# Patient Record
Sex: FEMALE | Race: WHITE | URBAN - METROPOLITAN AREA
[De-identification: names, ages, dates, MRNs, and addresses within clinical notes are randomized per-mention and may not be internally consistent; named-entity substitution may affect disease eponyms.]

---

## 2022-10-07 ENCOUNTER — OFFICE VISIT (OUTPATIENT)
Dept: OBGYN CLINIC | Facility: CLINIC | Age: 27
End: 2022-10-07
Payer: COMMERCIAL

## 2022-10-07 VITALS — SYSTOLIC BLOOD PRESSURE: 124 MMHG | WEIGHT: 205 LBS | DIASTOLIC BLOOD PRESSURE: 85 MMHG

## 2022-10-07 DIAGNOSIS — Z01.419 ENCOUNTER FOR WELL WOMAN EXAM WITH ROUTINE GYNECOLOGICAL EXAM: Primary | ICD-10-CM

## 2022-10-07 DIAGNOSIS — Z30.431 IUD CHECK UP: ICD-10-CM

## 2022-10-07 DIAGNOSIS — R87.612 LOW GRADE SQUAMOUS INTRAEPITHELIAL LESION (LGSIL) ON CERVICOVAGINAL CYTOLOGIC SMEAR: ICD-10-CM

## 2022-10-07 DIAGNOSIS — Z12.4 PAPANICOLAOU SMEAR FOR CERVICAL CANCER SCREENING: ICD-10-CM

## 2022-10-07 DIAGNOSIS — Z11.3 SCREENING EXAMINATION FOR SEXUALLY TRANSMITTED DISEASE: ICD-10-CM

## 2022-10-07 PROBLEM — F90.9 ATTENTION DEFICIT HYPERACTIVITY DISORDER (ADHD): Status: ACTIVE | Noted: 2022-10-07

## 2022-10-07 PROCEDURE — 3079F DIAST BP 80-89 MM HG: CPT | Performed by: NURSE PRACTITIONER

## 2022-10-07 PROCEDURE — 99385 PREV VISIT NEW AGE 18-39: CPT | Performed by: NURSE PRACTITIONER

## 2022-10-07 PROCEDURE — 3074F SYST BP LT 130 MM HG: CPT | Performed by: NURSE PRACTITIONER

## 2022-10-09 LAB
GENITAL VAGINOSIS SCREEN: POSITIVE
TRICHOMONAS SCREEN: NEGATIVE

## 2022-10-10 DIAGNOSIS — B37.31 VAGINAL CANDIDIASIS: ICD-10-CM

## 2022-10-10 DIAGNOSIS — N76.0 BACTERIAL VAGINOSIS: Primary | ICD-10-CM

## 2022-10-10 DIAGNOSIS — B96.89 BACTERIAL VAGINOSIS: Primary | ICD-10-CM

## 2022-10-10 LAB
C TRACH DNA SPEC QL NAA+PROBE: NEGATIVE
N GONORRHOEA DNA SPEC QL NAA+PROBE: NEGATIVE

## 2022-10-10 RX ORDER — FLUCONAZOLE 150 MG/1
150 TABLET ORAL ONCE
Qty: 2 TABLET | Refills: 0 | Status: SHIPPED | OUTPATIENT
Start: 2022-10-10 | End: 2022-10-10

## 2022-10-10 RX ORDER — METRONIDAZOLE 7.5 MG/G
1 GEL VAGINAL NIGHTLY
Qty: 1 EACH | Refills: 0 | Status: SHIPPED | OUTPATIENT
Start: 2022-10-10 | End: 2022-10-15

## 2022-10-31 ENCOUNTER — TELEPHONE (OUTPATIENT)
Dept: OBGYN CLINIC | Facility: CLINIC | Age: 27
End: 2022-10-31

## 2022-10-31 DIAGNOSIS — R87.610 ATYPICAL SQUAMOUS CELLS OF UNDETERMINED SIGNIFICANCE (ASCUS) ON PAPANICOLAOU SMEAR OF CERVIX: Primary | ICD-10-CM

## 2022-10-31 NOTE — TELEPHONE ENCOUNTER
Reviewed pap smear results and follow up HPV testing, will reach out with HPV results and follow up. Pt verbalized understanding and questions answered.

## 2022-11-10 ENCOUNTER — TELEPHONE (OUTPATIENT)
Dept: OBGYN CLINIC | Facility: CLINIC | Age: 27
End: 2022-11-10

## 2022-11-10 NOTE — TELEPHONE ENCOUNTER
Pt needed to know if she needed to schedule an appointment for her labs. Informed pt she does not need to schedule an appointment and that she can go to any [place] lab to have her labs done. Pt voices understanding.

## 2022-11-10 NOTE — TELEPHONE ENCOUNTER
Patient called back and scheduled HPV testing for 11/16, also wants to know if she can have bloodwork done at the same time. Please call to advise.

## 2022-11-10 NOTE — TELEPHONE ENCOUNTER
Attempted to reach patient, no answer left detailed message to call back office. Per Isabel Foss patient needs to return for HPV testing only. Patient had an ASCUS results on 10/7/2022 but no hpv was ran. Please help schedule patient for an appointment.

## 2022-11-16 ENCOUNTER — OFFICE VISIT (OUTPATIENT)
Dept: OBGYN CLINIC | Facility: CLINIC | Age: 27
End: 2022-11-16
Payer: COMMERCIAL

## 2022-11-16 VITALS — DIASTOLIC BLOOD PRESSURE: 92 MMHG | SYSTOLIC BLOOD PRESSURE: 132 MMHG | WEIGHT: 210 LBS

## 2022-11-16 DIAGNOSIS — Z11.51 SCREENING FOR HPV (HUMAN PAPILLOMAVIRUS): ICD-10-CM

## 2022-11-16 DIAGNOSIS — R35.0 URINARY FREQUENCY: ICD-10-CM

## 2022-11-16 DIAGNOSIS — R30.0 DYSURIA: Primary | ICD-10-CM

## 2022-11-16 LAB
BILIRUBIN: NEGATIVE
GLUCOSE (URINE DIPSTICK): NEGATIVE MG/DL
KETONES (URINE DIPSTICK): NEGATIVE MG/DL
MULTISTIX LOT#: ABNORMAL NUMERIC
NITRITE, URINE: POSITIVE
OCCULT BLOOD: NEGATIVE
PH, URINE: 6 (ref 4.5–8)
PROTEIN (URINE DIPSTICK): NEGATIVE MG/DL
SPECIFIC GRAVITY: 1.03 (ref 1–1.03)
UROBILINOGEN,SEMI-QN: 0.2 MG/DL (ref 0–1.9)

## 2022-11-16 PROCEDURE — 81002 URINALYSIS NONAUTO W/O SCOPE: CPT | Performed by: NURSE PRACTITIONER

## 2022-11-16 PROCEDURE — 99213 OFFICE O/P EST LOW 20 MIN: CPT | Performed by: NURSE PRACTITIONER

## 2022-11-16 PROCEDURE — 3075F SYST BP GE 130 - 139MM HG: CPT | Performed by: NURSE PRACTITIONER

## 2022-11-16 PROCEDURE — 3080F DIAST BP >= 90 MM HG: CPT | Performed by: NURSE PRACTITIONER

## 2022-11-16 RX ORDER — NITROFURANTOIN 25; 75 MG/1; MG/1
100 CAPSULE ORAL 2 TIMES DAILY
Qty: 14 CAPSULE | Refills: 0 | Status: SHIPPED | OUTPATIENT
Start: 2022-11-16 | End: 2022-11-21

## 2022-11-16 RX ORDER — METHYLPHENIDATE HYDROCHLORIDE 54 MG/1
TABLET ORAL
COMMUNITY
Start: 2012-01-30

## 2022-11-17 LAB
BILIRUB UR QL: NEGATIVE
COLOR UR: YELLOW
GLUCOSE UR-MCNC: NEGATIVE MG/DL
KETONES UR-MCNC: NEGATIVE MG/DL
NITRITE UR QL STRIP.AUTO: POSITIVE
PH UR: 5 [PH] (ref 5–8)
PROT UR-MCNC: 30 MG/DL
SP GR UR STRIP: 1.02 (ref 1–1.03)
UROBILINOGEN UR STRIP-ACNC: 2
VIT C UR-MCNC: NEGATIVE MG/DL
WBC #/AREA URNS AUTO: >50 /HPF
WBC CLUMPS UR QL AUTO: PRESENT /HPF

## 2022-11-18 LAB — HPV I/H RISK 1 DNA SPEC QL NAA+PROBE: NEGATIVE

## 2024-12-30 NOTE — PROGRESS NOTES
HPI:    Patient ID: Cinthia Saeed is a 29 year old female.  Patient  is in [employer] [place].  See GYNE  OCTOBER KYLEENA.    HPI New Patient/Physical Exam  29 year old female I am meeting for the first time.  She was living in [place] and recently moved back to [place].  She is on Vyvanse  She is on Topiramate for weight loss.    She went to a weight loss clinic in [place] and lost 38 pounds.  She was told she had fatty liver disease.    Does not vape  No smoking  No alcohol  + cannibals   Immunization History   Administered Date(s) Administered    TDAP 04/01/2019       No past medical history on file.   No past surgical history on file.   Social History     Socioeconomic History    Marital status: Single   Tobacco Use    Smoking status: Never    Smokeless tobacco: Never   Substance and Sexual Activity    Alcohol use: Yes     Alcohol/week: 1.0 standard drink of alcohol     Types: 1 Glasses of wine per week    Drug use: Yes     Types: Cannabis     Comment: socially          Review of Systems   Constitutional:  Negative for chills, fatigue and fever.   HENT:  Negative for congestion, ear pain, hearing loss, postnasal drip, sinus pain, sore throat, trouble swallowing and voice change.    Eyes:  Negative for pain and visual disturbance.   Respiratory:  Negative for cough, chest tightness and shortness of breath.    Cardiovascular:  Negative for chest pain, palpitations and leg swelling.   Gastrointestinal:  Negative for abdominal pain, constipation, diarrhea, nausea and vomiting.   Endocrine: Negative for cold intolerance and heat intolerance.   Genitourinary:  Negative for dysuria and hematuria.   Musculoskeletal:  Negative for back pain and joint swelling.   Skin:  Negative for rash.   Allergic/Immunologic: Negative for environmental allergies and food allergies (Hazelnut allergies).   Neurological:  Negative for weakness, numbness and headaches.   Hematological:  Does not bruise/bleed easily.    Psychiatric/Behavioral:  Negative for dysphoric mood and sleep disturbance. The patient is nervous/anxious.               Current Outpatient Medications   Medication Sig Dispense Refill    lisdexamfetamine (VYVANSE) 40 MG Oral Cap Take 1 capsule (40 mg total) by mouth daily. 30 capsule 0    [START ON 1/4/2025] lisdexamfetamine (VYVANSE) 40 MG Oral Cap Take 1 capsule (40 mg total) by mouth daily. 30 capsule 0    [START ON 2/4/2025] lisdexamfetamine (VYVANSE) 40 MG Oral Cap Take 1 capsule (40 mg total) by mouth daily. 30 capsule 0    topiramate 100 MG Oral Tab Take 1 tablet (100 mg total) by mouth 2 (two) times daily.      KYLEENA 19.5 MG Intrauterine IUD        Allergies:Allergies[1]   PHYSICAL EXAM:   Physical Exam  Constitutional:       Appearance: Normal appearance. She is well-developed.   HENT:      Head: Normocephalic.      Right Ear: Tympanic membrane normal.      Left Ear: Tympanic membrane normal.      Nose: Nose normal.      Mouth/Throat:      Mouth: Mucous membranes are moist.      Pharynx: No oropharyngeal exudate or posterior oropharyngeal erythema.   Eyes:      General:         Right eye: No discharge.         Left eye: No discharge.      Pupils: Pupils are equal, round, and reactive to light.   Cardiovascular:      Rate and Rhythm: Normal rate and regular rhythm.      Heart sounds: Normal heart sounds. No murmur heard.     No friction rub. No gallop.   Pulmonary:      Effort: Pulmonary effort is normal. No respiratory distress.      Breath sounds: Normal breath sounds. No wheezing, rhonchi or rales.   Abdominal:      General: Bowel sounds are normal. There is no distension.      Palpations: Abdomen is soft. There is no mass.      Tenderness: There is no abdominal tenderness (Right lower quadrant tenderness. Mild. States she is constipated). There is no right CVA tenderness, left CVA tenderness or guarding.   Musculoskeletal:         General: No tenderness.      Cervical back: Normal range of motion  and neck supple. No tenderness.      Right lower leg: No edema.      Left lower leg: No edema.   Lymphadenopathy:      Cervical: No cervical adenopathy.   Skin:     General: Skin is warm and dry.      Findings: No rash.   Neurological:      Mental Status: She is alert and oriented to person, place, and time.      Coordination: Coordination normal.      Gait: Gait normal.   Psychiatric:         Mood and Affect: Mood normal.         Behavior: Behavior normal.         Thought Content: Thought content normal.         Judgment: Judgment normal.       /89 (BP Location: Left arm, Patient Position: Sitting, Cuff Size: adult)   Pulse 91   Ht 5' 4.4\" (1.636 m)   Wt 176 lb (79.8 kg)   BMI 29.84 kg/m²   Wt Readings from Last 2 Encounters:   12/30/24 176 lb (79.8 kg)   11/16/22 210 lb (95.3 kg)     Body mass index is 29.84 kg/m².(2)  No results found for: \"WBC\", \"RBC\", \"HGB\", \"HCT\", \"MCV\", \"MCH\", \"MCHC\", \"RDW\", \"PLT\", \"MPV\"   No results found for: \"GLU\", \"BUN\", \"BUNCREA\", \"CREATSERUM\", \"ANIONGAP\", \"GFR\", \"GFRNAA\", \"GFRAA\", \"CA\", \"OSMOCALC\", \"ALKPHO\", \"AST\", \"ALT\", \"ALKPHOS\", \"BILT\", \"TP\", \"ALB\", \"GLOBULIN\", \"AGRATIO\", \"NA\", \"K\", \"CL\", \"CO2\"   No results found for: \"EAG\", \"A1C\"   No results found for: \"CHOLEST\", \"TRIG\", \"HDL\", \"LDL\", \"VLDL\", \"TCHDLRATIO\", \"NONHDLC\", \"CHOLHDLRATIO\", \"CALCNONHDL\"   No results found for: \"T4F\", \"TSH\", \"TSHT4\"             ASSESSMENT/PLAN:     Problem List Items Addressed This Visit       Annual physical exam     Normal exam.  Labs as ordered.  Skin check normal.  No significant abnormal nevi.  Breast exam completed-no palpable abnormalities, discharge from the nipples or axillary adenopathy.  No cervical or inguinal lymphadenopathy.  Hernial orifices intact.    Immunizations- Up to date         Relevant Orders    Comp Metabolic Panel (14)    Lipid Panel    Vitamin D, 25-Hydroxy    Vitamin B12    TSH W Reflex To Free T4    CBC, NO DIFFERENTIAL/PLATELET    Slow transit constipation     Has  had some constipation this week.  RLQ tenderness patient thinks is due to constipation.    Plan  Miralax for the next three days          Other Visit Diagnoses       Class 1 obesity without serious comorbidity with body mass index (BMI) of 30.0 to 30.9 in adult, unspecified obesity type    -  Primary    Relevant Orders    [facility] - Phoebe Esipnal MD [facility]           Instructed- Fever, chills nausea- Go to the ED. (Right Lower Quadrant Pain).    Orders Placed This Encounter   Procedures    Comp Metabolic Panel (14)    Lipid Panel    Vitamin D, 25-Hydroxy    Vitamin B12    TSH W Reflex To Free T4    CBC, NO DIFFERENTIAL/PLATELET       Meds This Visit:  Requested Prescriptions      No prescriptions requested or ordered in this encounter       Imaging & Referrals:  BARIATRICS - INTERNAL         DYAN Dao          [1]   Allergies  Allergen Reactions    Corylus HIVES

## 2024-12-30 NOTE — ASSESSMENT & PLAN NOTE
Has had some constipation this week.  RLQ tenderness patient thinks is due to constipation.    Plan  Miralax for the next three days

## 2024-12-30 NOTE — ASSESSMENT & PLAN NOTE
Normal exam.  Labs as ordered.  Skin check normal.  No significant abnormal nevi.  Breast exam completed-no palpable abnormalities, discharge from the nipples or axillary adenopathy.  No cervical or inguinal lymphadenopathy.  Hernial orifices intact.    Immunizations- Up to date

## 2025-01-08 NOTE — PROGRESS NOTES
[facility]  Obstetrics and Gynecology  Mac Alexander PA-C    Chief Complaint   Patient presents with    New Patient    Gyn Exam     Patient states her last pap was on 10/2024 (Normal) Kyleena was replace on 2024.        Cinthia Saeed is a 29 year old female  is a new patient to me presenting to [facility] care. No LMP recorded. (Menstrual status: IUD - Intrauterine Device). Has a Kyleena IUD which was replaced in 2024. Reports still having a light regular menses with device. Reports a history of LSIL on pap and MOISES I on colposcopy in . Has had normal pap smears since then, last one in 10/2024. Sexually active with same partner, recently had negative STD screening. She denies any abnormal vaginal discharge, odor, irritation, or itching. No breast pain or masses. No dysuria or hematuria.     Pap:10/2024   Contraception: Kyleena IUD  Gardasil: fully vaccinated       OBSTETRICS HISTORY:     OB History    Para Term  AB Living   0 0 0 0 0 0   SAB IAB Ectopic Multiple Live Births   0 0 0 0 0       GYNE HISTORY:     Menarche: 13y/o (2025  7:55 AM)  Period Cycle (Days): 29-30days (2025  7:55 AM)  Period Duration (Days): 2-4 days (2025  7:55 AM)  Period Flow: Very Light (2025  7:55 AM)  Use of Birth Control (if yes, specify type): Kyleena IUD (2025  7:55 AM)  Hx Prior Abnormal Pap: No (2025  7:55 AM)      History   Sexual Activity    Sexual activity: Yes    Partners: Male           Latest Ref Rng & Units 2022    10:07 AM 10/7/2022     9:52 AM   RECENT PAP RESULTS   INTERPRETATION/RESULT: Negative for intraepithelial lesion or malignancy  Atypical squamous cells of undetermined significance (ASC-US)    HPV Negative Negative           MEDICAL HISTORY:     No past medical history on file.   History reviewed. No pertinent surgical history.    SOCIAL HISTORY:     Tobacco Use: Low Risk  (2025)    Patient History     Smoking Tobacco Use: Never     Smokeless  Tobacco Use: Never     Passive Exposure: Not on file       Depression Screening:   Depression Screening (PHQ-2/PHQ-9): Over the LAST 2 WEEKS   Little interest or pleasure in doing things (over the last two weeks)?: Not at all  Little interest or pleasure in doing things: Not at all    Feeling down, depressed, or hopeless (over the last two weeks)?: Not at all  Feeling down, depressed, or hopeless: Not at all    PHQ-2 SCORE: 0  PHQ-2 SCORE: 0          FAMILY HISTORY:     History reviewed. No pertinent family history.    MEDICATIONS:       Current Outpatient Medications:     lisdexamfetamine (VYVANSE) 40 MG Oral Cap, Take 1 capsule (40 mg total) by mouth daily., Disp: 30 capsule, Rfl: 0    [START ON 2/4/2025] lisdexamfetamine (VYVANSE) 40 MG Oral Cap, Take 1 capsule (40 mg total) by mouth daily., Disp: 30 capsule, Rfl: 0    topiramate 100 MG Oral Tab, Take 1 tablet (100 mg total) by mouth 2 (two) times daily., Disp: , Rfl:     KYLEENA 19.5 MG Intrauterine IUD, , Disp: , Rfl:     ergocalciferol 1.25 MG (58790 UT) Oral Cap, Take 1 capsule (50,000 Units total) by mouth once a week for 12 doses. (Patient not taking: Reported on 1/8/2025), Disp: 12 capsule, Rfl: 0    ALLERGIES:     Allergies[1]    REVIEW OF SYSTEMS:     Review of Systems   Constitutional:  Negative for chills, fever and unexpected weight change.   Respiratory: Negative.     Cardiovascular: Negative.    Gastrointestinal:  Negative for abdominal pain, constipation, diarrhea and nausea.   Genitourinary:  Negative for dyspareunia, dysuria, genital sores, hematuria, menstrual problem, pelvic pain, vaginal bleeding, vaginal discharge and vaginal pain.   Musculoskeletal: Negative.    Skin: Negative.    Neurological: Negative.    Hematological: Negative.    Psychiatric/Behavioral: Negative.           PHYSICAL EXAM:     Vitals:    01/08/25 0752   BP: 119/83   Weight: 172 lb (78 kg)       Body mass index is 29.16 kg/m².     Constitutional: well developed, well  nourished  Psychiatric:  Oriented to time, place, person and situation. Appropriate mood and affect  Head/Face: normocephalic  Neck/Thyroid: thyroid symmetric, no thyromegaly, no nodules, no adenopathy  Lymphatic:no abnormal supraclavicular or axillary adenopathy is noted  Breast: normal without palpable masses, tenderness, asymmetry, nipple discharge, nipple retraction or skin changes  Abdomen:  soft, nontender, nondistended, no masses  Skin/Hair: no unusual rashes or bruises  Extremities: no edema, no cyanosis      ASSESSMENT:     Cinthia was seen today for new patient and gyn exam.    Diagnoses and all orders for this visit:    Encounter to establish care        PLAN:   Discussed safe sex practices and condom use.  Recommend daily exercise and well-rounded diet.  RTC for annual     SUMMARY:  Pap: Next cotest 3 years per ASCCP guidelines.  BCM:  Kyleena IUD  STD screening: No  Mammogram: n/a -- once 41 yrs old  HM updated    This is a 20 minute visit and greater than 50% of the time was spent counseling the patient and/or coordinating care.    JACQUE BLACKMAN PA-C  8:21 AM  1/8/2025    Note to patient and family:  The 21st Century Cures Act makes medical notes available to patients in the interest of transparency.  However, please be advised that this is a medical document.  It is intended as a peer to peer communication.  It is written in medical language and may contain abbreviations or verbiage that are technical and unfamiliar.  It may appear blunt or direct.  Medical documents are intended to carry relevant information, facts as evident, and the clinical opinion of the practitioner.       [1]   Allergies  Allergen Reactions    Corylus HIVES

## 2025-01-28 NOTE — PATIENT INSTRUCTIONS
Please try to work on the following dietary changes:  Goals: Aim for 20-30 grams of protein/ meal  Aim for <100 grams of carbohydrates/day  Eat 4-6 vegetables/day  Avoid skipping meals- eat every 4-5 hours  Aim for 3 meals/day  2. Drink lots of water and cut down on soda/juice consumption if soda/juice drinker  3. Focus on protein: (15-30 grams with each meal) ie. greek yogurt, cottage cheese, string cheese, hard boiled eggs  4. Healthy snacks: always have protein in your snack! peanut butter and apples, hummus and carrots, berries, nuts (1/4 cup), tuna and crackers                 Protein Shakes: Premier protein or Core Power                Protein Bars: Rx Bars, Oatmega, Power Crunch                 S[place]ento balanced breaks (cheese and nuts)- without chocolate  5. Reduce carbohydrates <100 grams which includes sweets as well as rice, pasta, potatoes, bread, corn and instead choose whole grain options or more protein or vegetables (4-6 servings of vegetables per day). Use [employer] sridevi for carb counting!  6. Get a good night of sleep  7. Try to decrease stress in life; meditate at least 10 minutes before sleeping! Try it and let me know what works for you, try youScicastsube or apps like Calm and [employer]!     Please download apps:  1. \"My Fitness Pal\" (other option is Lose it)) to help you to monitor daily dietary intake and you will be able to see if you are eating the right amount of calories, protein, carbs                With My Fitness Pal-->When you set-up the sridevi or need to adjust settings:                Goals should include:                 Lose 1.5-2 lbs per week                Activity level: not very active (can't count exercise towards calorie number per day)                   ** Daily INPUT> Look at nutrition section-- \"nutrients\" and it will break down your macros for the day (ie. Protein, carbs, fibers, sugars and fats). Try to stay within these numbers daily     2. \"7 minute workout\" to help with  exercise/activity which takes 7 minutes of your day and that you can do at home!   3. \"Calm\" or \"Headspace\" which helps with mindfulness, meditation, clarity, sleep, and talyor to your daily life.   4. Skinnytaste blog for healthy recipe ideas  5. DietThe Venue Report for low carb resources     HIGH PROTEIN SNACK IDEAS  -cottage cheese  -plain yogurt  -kefir  -hard-boiled eggs  -natural cheeses  -nuts (measure portion size)   -unsweetened nut butters  -dried edamame   -byron seeds soaked in water or almond milk  -soy nuts  -cured meats (monitor for sodium issues)   -hummus with vegetables  -bean dip with vegetables     FRUIT  Low carb fruit options   Raspberries: Half a cup (60 grams) contains 3 grams of carbs.  Blackberries: Half a cup (70 grams) contains 4 grams of carbs.  Strawberries: Half a cup (100 grams) contains 6 grams of carbs.  Blueberries: Half a cup (50 grams) contains 6 grams of carbs.  Plum: One medium-sized (80 grams) contains 6 grams of carbs.     VEGETABLES  Low carb vegetables           Understanding Carbohydrates  Goal <100g  A car needs the right type of fuel to run. And you need the right kind of food to function. To keep your energy level up, your body needs food that has carbohydrates (carbs). But carbs raise blood sugar levels higher and faster than other kinds of food. Your dietitian will work with you to figure out the amount of carbs youneed. Carbs come in 3 types: starches, sugars, and fiber.   Starches  Starches are found in grains, some vegetables, and beans. Grain products include bread, pasta, cereal, and tortillas. Starchy vegetables include potatoes, peas, corn, lima beans, yams, and squash. Kidney beans, loyola beans,black beans, garbanzo beans, and lentils also have starches.     Sugars  Sugars are found naturally in many foods. Or they can be added. Foods that contain natural sugar include fruits and fruit juices, dairy products, honey, and molasses. Added sugars are found in most  desserts, processed foods, candy, regular soda, and fruit drinks. These are very helpful to treat low blood sugar (hypoglycemia). They give you sugar quickly. Try to keep at least 15 to 20 grams of these simple sugars with you at all times. Eat or drink these if you start to havesymptoms of low blood sugar.   Fiber  Fiber comes from plant foods. Your body can't digest most fiber. Instead of raising blood sugar levels like other carbs, fiber stops blood sugar from rising too fast. Fiber is found in fruits, vegetables, whole grains, beans,peas, and many nuts.   Carb counting  Keep track of the amount of carbs you eat. This can help you keep the right balance of carbs, physical activity, and medicine. The amount of carbs you need will be different from what other people need. How much you need depends on many things. These include your health, the medicines you take, and how active you are. Your healthcare team will help you figure out the right amount of carbs for you. You may start with 45 to 60 grams of carbs per meal, depending on your case. Carb counting is a system that helps you keep track of thecarbohydrates you eat at each meal.   Carbs come from many foods. These include grains, starchy vegetables, fruit, milk, beans, and snack foods. You can either count carbohydrate grams or carbohydrate servings. When you count carbohydrate servings, 1 carbohydrateserving = 15 grams of carbohydrates.   Here are some examples of foods that have about 15 grams of carbs (1 serving of carbohydrates):   1/2 cup of canned or frozen fruit  A small piece of fresh fruit (4 ounces)  1 slice of bread  1/2 cup of oatmeal  1/3 cup of rice  4 to 6 crackers  1/2 English muffin  1/2 cup of black beans  1/4 of a large baked potato (3 ounces)  2/3 cup of plain fat-free yogurt  1 cup of soup  1/2 cup of casserole  6 chicken nuggets  2-inch-square brownie or cake without frosting  2 small cookies  1/2 cup of ice cream or sherbet  Carb  counting is easier when food labels are available. Look at the label to see how many grams of total carbs per serving the food contains. Then you can figure out how much you should eat. If your food doesn't have a nutrition label, you should be able to get an idea how many carbs there are per servingby using a book or website.   Two very important lines to look at on the label are the serving size and the total carbohydrate amount per serving. Here are some tips for using food labelsto count your carbs:   Check the serving size. The information on the label is based on that serving size. If you eat more than the listed serving size, you may have to double or triple the other information on the label.   Check the total grams of carbs.  Total carbohydrate from the label includes sugar, starch, and fiber. Be sure to use the total carbohydrate number (minus the fiber) and not sugar alone.  Know how many grams of carbs you can have.  Be familiar with the matching portion sizes.  Compare labels. Compare the labels of different products. Look at serving sizes and total carbs to find the products that work best for you.   Don't forget protein and fat. With the focus on carb counting, it might be easy to forget protein and fat in your meals. Don't forget to include sources of protein and healthy fat to balance your meals. Also watch how much salt (sodium) you eat. This is especially true if you have high blood pressure. If you have diabetes, limit the amount of sodium to less than 2,300 mg a day.    It’s also important to be consistent with the amount of carbs and time you eat when taking a fixed dose of diabetes medicine. Work with your healthcare provider or dietitian if you need more help. They can help you keep track of your carbs. They can also help you figure out how many grams of carbs youshould have.

## 2025-01-28 NOTE — PROGRESS NOTES
CC:   Chief Complaint   Patient presents with    Consult    Weight Management        Referring Physician to whom this note will be reported back to: No primary care provider on file.   Reason for medical consultation: Medical Management of Weight and Weight related comorbidities.      HPI:   -started topiramate 2/2024, started 25 mg qdaily, increased gradually to 50 mg BID, no side effects, was able to lose 38 lbs  -Already on Vyvanse since 15 mg 11/2024, previously on Concerta been on ADHD medications since age of 16    Body mass index is 29.18 kg/m².   Wt Readings from Last 6 Encounters:   01/28/25 174 lb (78.9 kg)   01/08/25 172 lb (78 kg)   12/30/24 176 lb (79.8 kg)   11/16/22 210 lb (95.3 kg)   10/07/22 205 lb (93 kg)     /76   Pulse 99   Ht 5' 4.75\" (1.645 m)   Wt 174 lb (78.9 kg)   SpO2 99%   BMI 29.18 kg/m²   Vitals:    01/28/25 0901   BP: 126/76   Pulse: 99     Body mass index is 29.18 kg/m².  Waist Circumference: 40.5 inches       Typical Dietary Intake:  Breakfast AM Snack Lunch PM Snack Dinner   skip skip Chicken patties  salad none Protein pasta  With veggies     Soda Drinker?: No    Number of restaurant or fast food meals/week:  0 meals/week    LABS and RESULTS:     [facility] Lab Encounter on 12/30/2024   Component Date Value    Glucose 12/30/2024 96     Sodium 12/30/2024 141     Potassium 12/30/2024 4.1     Chloride 12/30/2024 111     CO2 12/30/2024 21.0     Anion Gap 12/30/2024 9     BUN 12/30/2024 9     Creatinine 12/30/2024 0.84     BUN/CREA Ratio 12/30/2024 10.7     Calcium, Total 12/30/2024 9.9     Calculated Osmolality 12/30/2024 291     eGFR-Cr 12/30/2024 97     ALT 12/30/2024 13     AST 12/30/2024 13     Alkaline Phosphatase 12/30/2024 54     Bilirubin, Total 12/30/2024 0.7     Total Protein 12/30/2024 7.8     Albumin 12/30/2024 5.1 (H)     Globulin  12/30/2024 2.7     A/G Ratio 12/30/2024 1.9     Patient Fasting for CMP? 12/30/2024 Yes     Cholesterol, Total 12/30/2024 138     HDL  Cholesterol 12/30/2024 43     Triglycerides 12/30/2024 79     LDL Cholesterol 12/30/2024 79     VLDL 12/30/2024 12     Non HDL Chol 12/30/2024 95     Patient Fasting for Lipi* 12/30/2024 Yes     Vitamin D, 25OH, Total 12/30/2024 12.7 (L)     Vitamin B12 12/30/2024 360     TSH 12/30/2024 1.575     WBC 12/30/2024 6.9     RBC 12/30/2024 4.89     HGB 12/30/2024 15.1     HCT 12/30/2024 45.1     MCV 12/30/2024 92.2     MCH 12/30/2024 30.9     MCHC 12/30/2024 33.5     RDW 12/30/2024 11.9     RDW-SD 12/30/2024 40.4     PLT 12/30/2024 274.0        Current Outpatient Medications   Medication Sig Dispense Refill    semaglutide-weight management 0.25 MG/0.5ML Subcutaneous Solution Auto-injector Inject 0.5 mL (0.25 mg total) into the skin once a week for 4 doses. 2 mL 0    topiramate 25 MG Oral Tab Take 2 tablets (50 mg total) by mouth daily. 60 tablet 1    ergocalciferol 1.25 MG (56603 UT) Oral Cap Take 1 capsule (50,000 Units total) by mouth once a week for 12 doses. 12 capsule 0    lisdexamfetamine (VYVANSE) 40 MG Oral Cap Take 1 capsule (40 mg total) by mouth daily. 30 capsule 0    [START ON 2/4/2025] lisdexamfetamine (VYVANSE) 40 MG Oral Cap Take 1 capsule (40 mg total) by mouth daily. (Patient not taking: Reported on 1/28/2025) 30 capsule 0    KYLEENA 19.5 MG Intrauterine IUD         History reviewed. No pertinent past medical history.  History reviewed. No pertinent surgical history.    Social History:  Social History     Socioeconomic History    Marital status: Single   Tobacco Use    Smoking status: Never    Smokeless tobacco: Never   Vaping Use    Vaping status: Never Used   Substance and Sexual Activity    Alcohol use: Yes     Alcohol/week: 1.0 standard drink of alcohol     Types: 1 Glasses of wine per week    Drug use: Yes     Types: Cannabis     Comment: socially    Sexual activity: Yes     Partners: Male     Family History:  History reviewed. No pertinent family history.       REVIEW OF SYSTEMS:   10 point review  of systems otherwise negative.  With the exception of HPI and assessment and plan        EXAM:     GENERAL: NAD, conversant  SKIN: good turgor, no jaundice  HEENT: nl external nose and ears  NECK: supple  LUNGS: nl effort, clear to auscultation bilaterally  CARDIO: RRR, no murmur  ABDOMEN: NT/ND, no masses  EXTREMITIES: gait normal, no edema   PSYCH: Oriented times three, appropriate affect              ASSESSMENT AND PLAN:     1. Fatty liver  2. Binge eating  3. Obesity, Class II, BMI 35-39.9  4. Therapeutic drug monitoring    - Initial weight 212 lb (78.9 kg), Initial Body mass index is 35.55 kg/m².  - The patient participated in a comprehensive weight management program that encourages behavioral modification, reduced calorie diet and increased physical activity with continuing follow up for at least 6 months prior to using drug therapy.  - patient is interested in GLP1 medications, patient denies any personal or family history of MTC or in patients with Multiple Endocrine Neoplasia syndrome type 2 (MEN 2). Counseled patient regarding the potential risk for MTC with the use of semaglutide and inform them of symptoms of thyroid tumors (eg, a mass in the neck, dysphagia, dyspnea, persistent hoarseness).     Plan:  - semaglutide-weight management 0.25 MG/0.5ML Subcutaneous Solution Auto-injector; Inject 0.5 mL (0.25 mg total) into the skin once a week for 4 doses.  Dispense: 2 mL; Refill: 0  -once she starts with semaglutide, topiramate    Regarding Obesity:  Follow up with dietitian and psychologist as recommended.  Discussed the role of sleep and stress in weight management.  Labs orders as above.  Counseled on comprehensive weight loss plan including attention to nutrition, exercise and behavior/stress management for success. See patient instruction below for more details.  Weight Loss Consent to treat reviewed and signed.    Regarding weight loss Medications.  Medication use and side effects reviewed with  patient.    Medication will be used with a reduced calorie diet and increased physical activity in the management of exogenous obesity.  Patient will be responsible to let me know of any side effects or complications with medications.           Return in about 4 weeks (around 2/25/2025).     Phoebe Espinal MD

## 2025-03-04 NOTE — PROGRESS NOTES
Patient ID: Cinthia Saeed is a 29 year old female.  No chief complaint on file.         HISTORY OF PRESENT ILLNESS:   Patient presents for above.  This visit is conducted using Telemedicine with live, interactive video and audio.    Initial weight: 174 lbs  Current weight: 167 lbs  Interval weight loss: 7 lbs  Total weight loss: 7 lbs    Wt Readings from Last 6 Encounters:   01/28/25 174 lb (78.9 kg)   01/08/25 172 lb (78 kg)   12/30/24 176 lb (79.8 kg)   11/16/22 210 lb (95.3 kg)   10/07/22 205 lb (93 kg)       Review of Systems   Gastrointestinal:  Positive for constipation.   All other systems reviewed and are negative.     MEDICAL HISTORY:   History reviewed. No pertinent past medical history.    History reviewed. No pertinent surgical history.      Current Outpatient Medications:     semaglutide-weight management 1 MG/0.5ML Subcutaneous Solution Auto-injector, Inject 0.5 mL (1 mg total) into the skin once a week for 8 doses., Disp: 2 mL, Rfl: 1    lisdexamfetamine (VYVANSE) 40 MG Oral Cap, Take 1 capsule (40 mg total) by mouth daily., Disp: 30 capsule, Rfl: 0    topiramate 25 MG Oral Tab, Take 2 tablets (50 mg total) by mouth daily., Disp: 60 tablet, Rfl: 1    ergocalciferol 1.25 MG (05154 UT) Oral Cap, Take 1 capsule (50,000 Units total) by mouth once a week for 12 doses., Disp: 12 capsule, Rfl: 0    KYLEENA 19.5 MG Intrauterine IUD, , Disp: , Rfl:     Allergies:Allergies[1]      Social History     Socioeconomic History    Marital status: Single     Spouse name: Not on file    Number of children: Not on file    Years of education: Not on file    Highest education level: Not on file   Occupational History    Not on file   Tobacco Use    Smoking status: Never    Smokeless tobacco: Never   Vaping Use    Vaping status: Never Used   Substance and Sexual Activity    Alcohol use: Yes     Alcohol/week: 1.0 standard drink of alcohol     Types: 1 Glasses of wine per week    Drug use: Yes     Types: Cannabis      Comment: socially    Sexual activity: Yes     Partners: Male   Other Topics Concern    Not on file   Social History Narrative    Not on file     Social Drivers of Health     Food Insecurity: Not on file   Transportation Needs: Not on file   Stress: Not on file   Housing Stability: Not on file       PHYSICAL EXAM:   Unable to perform vitals or do physical exam as this is a virtual video visit.  Patient appears alert.  No conversational dyspnea or distress.    ASSESSMENT/PLAN:   1. Fatty liver  2. Binge eating  3. Obesity, Class II, BMI 35-39.9  4. Therapeutic drug monitoring    - Initial weight 212 lb (78.9 kg), Initial Body mass index is 35.55 kg/m².  - topiramate used for 2 months 1-3/2025 no weigh tloss  - The patient participated in a comprehensive weight management program that encourages behavioral modification, reduced calorie diet and increased physical activity with continuing follow up for at least 6 months prior to using drug therapy.  - patient is interested in GLP1 medications, patient denies any personal or family history of MTC or in patients with Multiple Endocrine Neoplasia syndrome type 2 (MEN 2). Counseled patient regarding the potential risk for MTC with the use of semaglutide and inform them of symptoms of thyroid tumors (eg, a mass in the neck, dysphagia, dyspnea, persistent hoarseness).     Plan:  - semaglutide-weight management 0.25 MG/0.5ML Subcutaneous Solution Auto-injector; Inject 0.5 mL (0.25 mg total) into the skin once a week for 4 doses.  Dispense: 2 mL; Refill: 0    Return in about 2 months (around 5/4/2025).    Time spent on encounter  30 minutes   Video time 10 minutes   Documentation time 20 minutes     Cinthia Saeed understands video evaluation is not a substitute for face-to-face examination or emergency care. Patient advised to go to ER or call 911 for worsening symptoms or acute distress.     Telehealth outside of [facility]  Telehealth Verbal Consent   I conducted a telehealth  visit with Cinthia Saeed today, 03/04/25, which was completed using two-way, real-time interactive audio and video communication. This has been done in good senait to provide continuity of care in the best interest of the provider-patient relationship, due to the COVID -19 public health crisis/national emergency where restrictions of face-to-face office visits are ongoing. Every conscious effort was taken to allow for sufficient and adequate time to complete the visit.  The patient was made aware of the limitations of the telehealth visit, including treatment limitations as no physical exam could be performed.  The patient was advised to call 911 or to go to the ER in case there was an emergency.  The patient was also advised of the potential privacy & security concerns related to the telehealth platform.   The patient was made aware of [facility] to [facility] practices, telehealth consent form and other related consent forms and documents.  which are located on the [facility] website. The patient verbally agreed to telehealth consent form, related consents and the risks discussed.    Lastly, the patient confirmed that they were in [place].   Included in this visit, time may have been spent reviewing labs, medications, radiology tests and decision making. Appropriate medical decision-making and tests are ordered as detailed in the plan of care above.  Coding/billing information is submitted for this visit based on complexity of care and/or time spent for the visit.    This note was prepared using Dragon Medical voice recognition dictation software. As a result errors may occur. When identified these errors have been corrected. While every attempt is made to correct errors during dictation discrepancies may still exist.    Phoebe Espinal MD  3/4/2025       [1]   Allergies  Allergen Reactions    Corylus HIVES

## 2025-05-15 NOTE — PROGRESS NOTES
Patient ID: Cinthia Saeed is a 30 year old female.  No chief complaint on file.         HISTORY OF PRESENT ILLNESS:   Patient presents for above.  This visit is conducted using Telemedicine with live, interactive video and audio.    Initial weight: 174 lbs 1/2025  Current weight: 151 lbs  Interval weight loss: 16 lbs  Total weight loss: 23 lbs    Wt Readings from Last 6 Encounters:   01/28/25 174 lb (78.9 kg)   01/08/25 172 lb (78 kg)   12/30/24 176 lb (79.8 kg)   11/16/22 210 lb (95.3 kg)   10/07/22 205 lb (93 kg)       Review of Systems   Gastrointestinal:  Positive for constipation.   All other systems reviewed and are negative.     MEDICAL HISTORY:   History reviewed. No pertinent past medical history.    History reviewed. No pertinent surgical history.      Current Outpatient Medications:     semaglutide-weight management 1.7 MG/0.75ML Subcutaneous Solution Auto-injector, Inject 0.75 mL (1.7 mg total) into the skin once a week for 36 doses., Disp: 9 mL, Rfl: 2    lisdexamfetamine (VYVANSE) 40 MG Oral Cap, Take 1 capsule (40 mg total) by mouth daily., Disp: 30 capsule, Rfl: 0    [START ON 6/5/2025] lisdexamfetamine (VYVANSE) 40 MG Oral Cap, Take 1 capsule (40 mg total) by mouth daily., Disp: 30 capsule, Rfl: 0    KYLEENA 19.5 MG Intrauterine IUD, , Disp: , Rfl:     Allergies:Allergies[1]      Social History     Socioeconomic History    Marital status: Single     Spouse name: Not on file    Number of children: Not on file    Years of education: Not on file    Highest education level: Not on file   Occupational History    Not on file   Tobacco Use    Smoking status: Never    Smokeless tobacco: Never   Vaping Use    Vaping status: Never Used   Substance and Sexual Activity    Alcohol use: Yes     Alcohol/week: 1.0 standard drink of alcohol     Types: 1 Glasses of wine per week    Drug use: Yes     Types: Cannabis     Comment: socially    Sexual activity: Yes     Partners: Male   Other Topics Concern     Not on file   Social History Narrative    Not on file     Social Drivers of Health     Food Insecurity: Not on file   Transportation Needs: Not on file   Stress: Not on file   Housing Stability: Not on file       PHYSICAL EXAM:   Unable to perform vitals or do physical exam as this is a virtual video visit.  Patient appears alert.  No conversational dyspnea or distress.    ASSESSMENT/PLAN:   1. Fatty liver  2. Binge eating  3. Obesity, Class II, BMI 35-39.9  4. Therapeutic drug monitoring    - Initial weight 212 lb (78.9 kg), Initial Body mass index is 35.55 kg/m².  - topiramate used for 2 months 1-3/2025 no weigh tloss  - The patient participated in a comprehensive weight management program that encourages behavioral modification, reduced calorie diet and increased physical activity with continuing follow up for at least 6 months prior to using drug therapy.  - patient is interested in GLP1 medications, patient denies any personal or family history of MTC or in patients with Multiple Endocrine Neoplasia syndrome type 2 (MEN 2). Counseled patient regarding the potential risk for MTC with the use of semaglutide and inform them of symptoms of thyroid tumors (eg, a mass in the neck, dysphagia, dyspnea, persistent hoarseness).     Plan:  - semaglutide-weight management 1 MG/0.5ML Subcutaneous Solution Auto-injector;    Return in about 3 months (around 8/15/2025).    Time spent on encounter  30 minutes   Video time 10 minutes   Documentation time 20 minutes     Cinthia Saeed understands video evaluation is not a substitute for face-to-face examination or emergency care. Patient advised to go to ER or call 911 for worsening symptoms or acute distress.     Telehealth outside of [facility]  Telehealth Verbal Consent   I conducted a telehealth visit with Cinthia Saeed today, 03/04/25, which was completed using two-way, real-time interactive audio and video communication. This has been done in good senait to  provide continuity of care in the best interest of the provider-patient relationship, due to the COVID -19 public health crisis/national emergency where restrictions of face-to-face office visits are ongoing. Every conscious effort was taken to allow for sufficient and adequate time to complete the visit.  The patient was made aware of the limitations of the telehealth visit, including treatment limitations as no physical exam could be performed.  The patient was advised to call 911 or to go to the ER in case there was an emergency.  The patient was also advised of the potential privacy & security concerns related to the telehealth platform.   The patient was made aware of where to find [facility]'s notice of privacy practices, telehealth consent form and other related consent forms and documents.  which are located on the [facility] website. The patient verbally agreed to telehealth consent form, related consents and the risks discussed.    Lastly, the patient confirmed that they were in [place].   Included in this visit, time may have been spent reviewing labs, medications, radiology tests and decision making. Appropriate medical decision-making and tests are ordered as detailed in the plan of care above.  Coding/billing information is submitted for this visit based on complexity of care and/or time spent for the visit.    This note was prepared using Dragon Medical voice recognition dictation software. As a result errors may occur. When identified these errors have been corrected. While every attempt is made to correct errors during dictation discrepancies may still exist.    Phoebe Espinal MD         [1]   Allergies  Allergen Reactions    Corylus HIVES